# Patient Record
Sex: FEMALE | Race: WHITE | NOT HISPANIC OR LATINO | URBAN - METROPOLITAN AREA
[De-identification: names, ages, dates, MRNs, and addresses within clinical notes are randomized per-mention and may not be internally consistent; named-entity substitution may affect disease eponyms.]

---

## 2019-04-23 ENCOUNTER — TELEPHONE (OUTPATIENT)
Dept: OBSTETRICS AND GYNECOLOGY | Facility: CLINIC | Age: 34
End: 2019-04-23

## 2019-04-23 ENCOUNTER — LAB (OUTPATIENT)
Dept: LAB | Facility: HOSPITAL | Age: 34
End: 2019-04-23

## 2019-04-23 ENCOUNTER — OFFICE VISIT (OUTPATIENT)
Dept: OBSTETRICS AND GYNECOLOGY | Facility: CLINIC | Age: 34
End: 2019-04-23

## 2019-04-23 VITALS
SYSTOLIC BLOOD PRESSURE: 106 MMHG | WEIGHT: 111.2 LBS | DIASTOLIC BLOOD PRESSURE: 68 MMHG | HEIGHT: 66 IN | BODY MASS INDEX: 17.87 KG/M2

## 2019-04-23 DIAGNOSIS — R30.0 DYSURIA: ICD-10-CM

## 2019-04-23 DIAGNOSIS — Z01.411 ENCOUNTER FOR GYNECOLOGICAL EXAMINATION WITH ABNORMAL FINDING: ICD-10-CM

## 2019-04-23 DIAGNOSIS — N92.1 METRORRHAGIA: Primary | ICD-10-CM

## 2019-04-23 LAB
BILIRUB UR QL STRIP: NEGATIVE
CLARITY UR: CLEAR
COLOR UR: YELLOW
GLUCOSE UR STRIP-MCNC: NEGATIVE MG/DL
HGB UR QL STRIP.AUTO: NEGATIVE
KETONES UR QL STRIP: NEGATIVE
LEUKOCYTE ESTERASE UR QL STRIP.AUTO: NEGATIVE
NITRITE UR QL STRIP: NEGATIVE
PH UR STRIP.AUTO: 7 [PH] (ref 5–8)
PROT UR QL STRIP: NEGATIVE
SP GR UR STRIP: 1.02 (ref 1–1.03)
UROBILINOGEN UR QL STRIP: NORMAL

## 2019-04-23 PROCEDURE — 81003 URINALYSIS AUTO W/O SCOPE: CPT

## 2019-04-23 PROCEDURE — 99385 PREV VISIT NEW AGE 18-39: CPT | Performed by: OBSTETRICS & GYNECOLOGY

## 2019-04-23 RX ORDER — ALPRAZOLAM 2 MG/1
1 TABLET ORAL AS NEEDED
COMMUNITY
Start: 2019-03-07 | End: 2019-11-04

## 2019-04-23 RX ORDER — NORETHINDRONE ACETATE AND ETHINYL ESTRADIOL 1.5-30(21)
1 KIT ORAL DAILY
Qty: 28 TABLET | Refills: 5 | Status: SHIPPED | OUTPATIENT
Start: 2019-04-23 | End: 2019-07-02

## 2019-04-23 RX ORDER — TRAZODONE HYDROCHLORIDE 100 MG/1
1 TABLET ORAL NIGHTLY
COMMUNITY
Start: 2019-04-04 | End: 2019-11-04

## 2019-04-23 RX ORDER — DILTIAZEM HYDROCHLORIDE 60 MG/1
TABLET, FILM COATED ORAL
Refills: 1 | COMMUNITY
Start: 2019-04-10 | End: 2019-11-04

## 2019-04-23 RX ORDER — CLONAZEPAM 0.5 MG/1
1 TABLET ORAL AS NEEDED
COMMUNITY
Start: 2019-04-08 | End: 2019-11-04

## 2019-04-23 RX ORDER — LAMOTRIGINE 100 MG/1
100 TABLET ORAL 2 TIMES DAILY
COMMUNITY
End: 2019-11-04

## 2019-04-23 RX ORDER — DEXTROAMPHETAMINE SACCHARATE, AMPHETAMINE ASPARTATE, DEXTROAMPHETAMINE SULFATE AND AMPHETAMINE SULFATE 7.5; 7.5; 7.5; 7.5 MG/1; MG/1; MG/1; MG/1
1 TABLET ORAL DAILY
COMMUNITY
Start: 2019-04-04 | End: 2019-11-04

## 2019-04-23 NOTE — PROGRESS NOTES
Chief Complaint   Patient presents with   • Gynecologic Exam   • Menstrual Problem     intermenstrual spotting.  patient states that she gets a fever at the beginning of her periods. abdominal pain.  PMDD is severe.   • Urinary Frequency     urgency, dysuria       Antonette Aguirre is a 34 y.o. year old  presenting to be seen for her first annual exam with me.  This patient is nulliparous.  She does not desire childbearing.  She has developed heavy menses with passage of clots and has spotting almost daily between her periods.  She states that she was treated with Darling, oral contraceptives in the past for ovarian cyst formation.  She states that at the time of her menses she occasionally has fever and chills.  She denies any rash on her palms or feet.  She has had no syncope.  She currently has some dysuria.  She denies bowel symptoms.  She is treated for ADD and severe depression.  She currently takes Adderall, Klonopin, Xanax, and Trazodone.  SCREENING TESTS  NO PRIOR DATA  Year 2012 202 2023 [id number]   Age                         PAP                         HPV high risk                         Mammogram                         BILLY score                         Breast MRI                         Lipids                         Vitamin D                         Colonoscopy                         DEXA  Frax (hip/any)                         Ovarian Screen                             She exercises regularly: yes.  She wears her seat belt: yes.  She has concerns about domestic violence: no.  She has noticed changes in height: no    GYN screening history:  · No Pap test results available.    No Additional Complaints Reported    The following portions of the patient's history were reviewed and updated as appropriate:vital signs and   She  has a past medical history of Anxiety, Asthma, Depression, and Seasonal allergies.  She  "does not have any pertinent problems on file.  She  has no past surgical history on file.  Her family history is not on file.  She  reports that she has quit smoking. She has never used smokeless tobacco. She reports that she uses drugs. Drug: Marijuana. She reports that she does not drink alcohol.  Current Outpatient Medications   Medication Sig Dispense Refill   • albuterol (PROAIR RESPICLICK) 108 (90 Base) MCG/ACT inhaler Inhale 2 puffs As Needed for Wheezing.     • lamoTRIgine (LaMICtal) 100 MG tablet Take 100 mg by mouth 2 (Two) Times a Day.     • ALPRAZolam (XANAX) 2 MG tablet Take 1 tablet by mouth As Needed.     • amphetamine-dextroamphetamine (ADDERALL) 30 MG tablet Take 1 tablet by mouth Daily.     • clonazePAM (KlonoPIN) 0.5 MG tablet Take 1 tablet by mouth As Needed.     • SYMBICORT 80-4.5 MCG/ACT inhaler INHALE 2 PUFFS TWICE DAILY (MORNING AND EVENING)  1   • traZODone (DESYREL) 100 MG tablet Take 1 tablet by mouth Every Night.       No current facility-administered medications for this visit.      She is allergic to neurontin [gabapentin]..    Review of Systems  A comprehensive review of systems was taken.  Constitutional: negative for fever, chills, activity change, appetite change, fatigue and unexpected weight change.  Respiratory: negative  Cardiovascular: negative  Gastrointestinal: negative  Genitourinary:positive for dysuria and frequency  Musculoskeletal:negative  Behavioral/Psych: positive for anxiety, depression and mood swings       /68   Ht 167.6 cm (66\")   Wt 50.4 kg (111 lb 3.2 oz)   LMP 03/21/2019 (Approximate)   Breastfeeding? No   BMI 17.95 kg/m²     Physical Exam    General:  alert; cooperative; well developed; well nourished   Skin:  No suspicious lesions seen   Thyroid: normal to inspection and palpation   Lungs:  clear to auscultation bilaterally   Heart:  regular rate and rhythm, S1, S2 normal, no murmur, click, rub or gallop   Breasts:  Examined in supine " position  Symmetric without masses or skin dimpling  Nipples normal without inversion, lesions or discharge  There are no palpable axillary nodes   Abdomen: soft, non-tender; no masses  no umbilical or inguinal hernias are present  no hepato-splenomegaly   Pelvis: Clinical staff was present for exam  External genitalia:  normal appearance of the external genitalia including Bartholin's and [place]'s glands.  Vaginal:  normal pink mucosa without prolapse or lesions. blood present -  dark red;  Cervix:  normal appearance.  Uterus:  normal size, shape and consistency. anteverted;  Adnexa:  normal bimanual exam of the adnexa.  Rectal:  anus visually normal appearing. recto-vaginal exam unremarkable and confirms findings;     Lab Review   No data reviewed    Imaging  No data reviewed         ASSESSMENT  Problems Addressed this Visit        Genitourinary    Metrorrhagia - Primary    Relevant Orders    US Non-ob Transvaginal      Other Visit Diagnoses     Encounter for gynecological examination with abnormal finding        Relevant Orders    Liquid-based Pap Smear, Screening    Dysuria        Relevant Orders    Urinalysis With Culture If Indicated - Urine, Catheter In/Out          PLAN    · Medications prescribed this encounter:  No orders of the defined types were placed in this encounter.  · Cath UA sent  · Pap test done  · Pelvic ultrasound- reveals a normal size, anteverted uterus.  The endometrial stripe is 12.3 mm (the patient is bleeding).  The ovaries are normal with a tiny paraovarian cyst on the left side.  · I have had an 18-minute face-to-face discussion with this patient about her abnormal menses.  I have counseled her that she needs a pelvic ultrasound to evaluate her uterus and ovaries.  I have counseled her that if her pelvic ultrasound is normal I will consider cycling her with oral contraceptives.  I have discussed with her the possibility of hysteroscopy/D&C/endometrial ablation along with tubal  sterilization.  I have reviewed the potential risk factors of this procedure with her.  All of her questions were answered.  After reviewing the pelvic ultrasound results with her I have decided to initiate Microgestin 1.5/30 oral contraceptives to attempt to control her heavy and irregular flow.  If she is able to gain control she would then like to go to [facility] oral contraceptives.  I have advised her to notify her psychiatrist that she is taking an oral contraceptive.  · Regular weight-bearing exercise  · Follow up: 6 months to assess menses  *Please note that portions of this documentation may have been completed with a voice recognition program.  Efforts were made to edit this dictation, but occasional words may have been mistranscribed.       This note was electronically signed.    STEFANIA Gastelum MD  April 23, 2019  3:34 PM

## 2019-04-29 ENCOUNTER — TELEPHONE (OUTPATIENT)
Dept: OBSTETRICS AND GYNECOLOGY | Facility: CLINIC | Age: 34
End: 2019-04-29

## 2019-04-29 NOTE — TELEPHONE ENCOUNTER
PT STATES SHE IS LEAVING OUT OF TOWN AND DR BATERS PRESCRIBED HER BIRTH CONTROL SHE STARTED IT TODAY AND IT HAS MADE HER VERY SICK - THROWING UP AND NAUSEATED - WHAT CAN SHE DO?  PLEASE CALL [phone]

## 2019-04-29 NOTE — TELEPHONE ENCOUNTER
Patient is not interested in trying NuvaRing or any other type of birth control pill/hormone.  She has already tried these things in the past without relief.  She is leaving the country tomorrow for [place] and will be gone X 3 weeks.  She is going to try to take the birth control pills at another time, but will discontinue if she has nausea/vomiting.  She is interested in endometrial ablation/tubal sterilization.  She thought that this procedure would help with her PMDD, and I did let her know that the ablation would cause her to have no periods or very light periods, but she would still have ovarian function and may have no improvement in PMDD.  She voiced understanding, but still feels that she would like to have procedure.  She will call back to schedule.

## 2019-04-29 NOTE — TELEPHONE ENCOUNTER
"Patient started birth control pills last night.  Took at bedtime with Trazodone.  Had oatmeal and toast at bedtime (\"Trazodone makes me very hungry\").  Woke up at 3 am with nausea and vomiting.      Please advise.  "

## 2019-04-29 NOTE — TELEPHONE ENCOUNTER
Please tell the patient to try taking her birth control pill without any other medications.  If she still has nausea and vomiting she will need to discontinue the birth control pill and we will need to consider NuvaRing.

## 2019-07-02 ENCOUNTER — OFFICE VISIT (OUTPATIENT)
Dept: OBSTETRICS AND GYNECOLOGY | Facility: CLINIC | Age: 34
End: 2019-07-02

## 2019-07-02 VITALS
HEIGHT: 66 IN | BODY MASS INDEX: 17.87 KG/M2 | WEIGHT: 111.2 LBS | SYSTOLIC BLOOD PRESSURE: 100 MMHG | DIASTOLIC BLOOD PRESSURE: 62 MMHG

## 2019-07-02 DIAGNOSIS — N92.1 METRORRHAGIA: Primary | ICD-10-CM

## 2019-07-02 PROCEDURE — 99214 OFFICE O/P EST MOD 30 MIN: CPT | Performed by: OBSTETRICS & GYNECOLOGY

## 2019-07-02 RX ORDER — HYDROXYZINE HYDROCHLORIDE 25 MG/1
1 TABLET, FILM COATED ORAL AS NEEDED
COMMUNITY
Start: 2019-06-28 | End: 2019-11-04

## 2019-07-02 NOTE — PROGRESS NOTES
Chief Complaint   Patient presents with   • Follow-up     discuss treatment for metrorrhagia.  patient is interested in possible endometrial ablation/tubal sterilization.   • Fatigue   • Fever     patient states that she gets a fever 1-2 days prior to menses and possibly the first 2 days of periods.  states that this is not related to tampon use.       Antonette Aguirre is a 34 y.o. year old  presenting to be seen because of follow-up for heavy menses and intermenstrual bleeding.  This patient is nulliparous.  She does not desire child-bearing.  She has low-grade fever at the time of her menses.  She is treated for ADD and depression.  She was prescribed an oral contraceptive however she did not continue that medication.  She is not interested in an IUD or an NuvaRing.  She is interested in a more definitive treatment for her abnormal bleeding.    Social History     Substance and Sexual Activity   Sexual Activity Not Currently     SCREENING TESTS    Year 2012   Age                         PAP        Neg.                 HPV high risk                         Mammogram                         BILLY score                         Breast MRI                         Lipids                         Vitamin D                         Colonoscopy                         DEXA  Frax (hip/any)                         Ovarian Screen                             No Additional Complaints Reported    The following portions of the patient's history were reviewed and updated as appropriate:vital signs and   She  has a past medical history of Anxiety, Asthma, Depression, and Seasonal allergies.  She does not have any pertinent problems on file.  She  has no past surgical history on file.  Her family history is not on file.  She  reports that she has quit smoking. She has never used smokeless tobacco. She reports that she uses  "drugs. Drug: Marijuana. She reports that she does not drink alcohol.  Current Outpatient Medications   Medication Sig Dispense Refill   • albuterol (PROAIR RESPICLICK) 108 (90 Base) MCG/ACT inhaler Inhale 2 puffs As Needed for Wheezing.     • ALPRAZolam (XANAX) 2 MG tablet Take 1 tablet by mouth As Needed.     • amphetamine-dextroamphetamine (ADDERALL) 30 MG tablet Take 1 tablet by mouth Daily.     • clonazePAM (KlonoPIN) 0.5 MG tablet Take 1 tablet by mouth As Needed.     • hydrOXYzine (ATARAX) 25 MG tablet Take 1 tablet by mouth As Needed.     • lamoTRIgine (LaMICtal) 100 MG tablet Take 100 mg by mouth 2 (Two) Times a Day.     • SYMBICORT 80-4.5 MCG/ACT inhaler INHALE 2 PUFFS TWICE DAILY (MORNING AND EVENING)  1   • traZODone (DESYREL) 100 MG tablet Take 1 tablet by mouth Every Night.       No current facility-administered medications for this visit.      She is allergic to neurontin [gabapentin]..        Review of Systems  A comprehensive review of systems was done.  Constitutional: negative for fever, chills, activity change, appetite change, fatigue and unexpected weight change.  Respiratory: negative  Cardiovascular: negative  Gastrointestinal: negative  Genitourinary:positive for heavy menses and bleeding between menses  Musculoskeletal:negative  Behavioral/Psych: negative          /62   Ht 167.6 cm (66\")   Wt 50.4 kg (111 lb 3.2 oz)   LMP 06/08/2019 (Exact Date)   Breastfeeding? No   BMI 17.95 kg/m²     Physical Exam    General:  alert; cooperative; well developed; well nourished   Skin:  No suspicious lesions seen   Thyroid: normal to inspection and palpation   Lungs:  clear to auscultation bilaterally   Heart:  regular rate and rhythm, S1, S2 normal, no murmur, click, rub or gallop   Breasts:  Not performed.   Abdomen: soft, non-tender; no masses  no umbilical or inguinal hernias are present  no hepato-splenomegaly   Pelvis: Not performed.     Lab Review   No data reviewed    Imaging   No data " reviewed    Medical counseling was given to patient for the following topics: diagnostic results, instructions for management, risk factor reductions, impressions, risks and benefits of treatment options and importance of treatment compliance . Total time of the encounter was 28 minute(s) and 24 minute(s)  was spent in face to face counseling.  I have reviewed with the patient options of therapy including hormonal intervention; conservative management without medication; tubal sterilization and hysteroscopy endometrial ablation; and a minimally-invasive hysterectomy with retention of her ovaries.  The patient is adamant that she does not desire childbearing.  She prefers to have a more definitive procedure.  She is not willing to take a risk of an ablation not controlling her menses.  I have reviewed with her the surgical risks of bowel, bladder, ureteral injury; bleeding, infection, and anesthetic risks.  I have counseled the patient that at her age I would prefer to leave her ovaries in place.  She does not desire hormonal replacement therapy.  She is concerned about her anxiety/depression and effects of surgery and anesthesia.  I have addressed these concerns with her.            ASSESSMENT  Problems Addressed this Visit        Genitourinary    Metrorrhagia - Primary            PLAN    · Medications prescribed this encounter:  No orders of the defined types were placed in this encounter.  · Pamphlets about hysterectomy including robotic hysterectomy.  · Follow up: PRN to do pre-op visit  · Ibuprofen, 600 mg, Q6h PRN at menses     *Please note that portions of this documentation may have been completed with a voice recognition program.  Efforts were made to edit this dictation, but occasional words may have been mistranscribed.     This note was electronically signed.    STEFANIA Gastelum MD  July 2, 2019  4:03 PM

## 2019-07-24 ENCOUNTER — APPOINTMENT (OUTPATIENT)
Dept: PREADMISSION TESTING | Facility: HOSPITAL | Age: 34
End: 2019-07-24

## 2019-07-24 ENCOUNTER — OFFICE VISIT (OUTPATIENT)
Dept: OBSTETRICS AND GYNECOLOGY | Facility: CLINIC | Age: 34
End: 2019-07-24

## 2019-07-24 VITALS
WEIGHT: 110.6 LBS | SYSTOLIC BLOOD PRESSURE: 110 MMHG | HEIGHT: 66 IN | BODY MASS INDEX: 17.78 KG/M2 | DIASTOLIC BLOOD PRESSURE: 68 MMHG

## 2019-07-24 DIAGNOSIS — N92.1 METRORRHAGIA: Primary | ICD-10-CM

## 2019-07-24 PROCEDURE — 99215 OFFICE O/P EST HI 40 MIN: CPT | Performed by: OBSTETRICS & GYNECOLOGY

## 2019-07-24 NOTE — PROGRESS NOTES
Chief Complaint   Patient presents with   • Follow-up     patient is here today to discuss upcoming TLH.  will watch DVD about TLH today.       Antonette Aguirre is a 34 y.o. year old  presenting to be seen because of have questions answered about management of her metrorrhagia.  This patient has not desired hormonal intervention.  She has scheduled a robotically-assisted total laparoscopic hysterectomy/vaginal cuff suspension/bilateral salpingectomies, however she desires information about hysteroscopy/NovaSure endometrial ablation and laparoscopic tubal banding.  She has washed an informational tape about robotic hysterectomy.  I have then spent 45 minutes answering her questions about both of these approaches to surgery.  I have answered her questions about my experience doing these procedures.  I have answered her questions about risks and benefits including the surgical risks of bowel, bladder, ureteral injury; bleeding, infection, and anesthetic risks.  I have answered her questions about the failure rate of endometrial ablation and tubal sterilization.    Social History     Substance and Sexual Activity   Sexual Activity Not Currently            SCREENING TESTS    Year 2012   Age                         PAP        Neg.                 HPV high risk                         Mammogram                         BILLY score                         Breast MRI                         Lipids                         Vitamin D                         Colonoscopy                         DEXA  Frax (hip/any)                         Ovarian Screen                             No Additional Complaints Reported    The following portions of the patient's history were reviewed and updated as appropriate:vital signs and   She  has a past medical history of Anxiety, Asthma, Depression, and Seasonal allergies.  She  "does not have any pertinent problems on file.  She  has no past surgical history on file.  Her family history is not on file.  She  reports that she has quit smoking. She has never used smokeless tobacco. She reports that she uses drugs. Drug: Marijuana. She reports that she does not drink alcohol.  Current Outpatient Medications   Medication Sig Dispense Refill   • albuterol (PROAIR RESPICLICK) 108 (90 Base) MCG/ACT inhaler Inhale 2 puffs As Needed for Wheezing.     • ALPRAZolam (XANAX) 2 MG tablet Take 1 tablet by mouth As Needed.     • amphetamine-dextroamphetamine (ADDERALL) 30 MG tablet Take 1 tablet by mouth Daily.     • clonazePAM (KlonoPIN) 0.5 MG tablet Take 1 tablet by mouth As Needed.     • hydrOXYzine (ATARAX) 25 MG tablet Take 1 tablet by mouth As Needed.     • lamoTRIgine (LaMICtal) 100 MG tablet Take 100 mg by mouth 2 (Two) Times a Day.     • SYMBICORT 80-4.5 MCG/ACT inhaler INHALE 2 PUFFS TWICE DAILY (MORNING AND EVENING)  1   • traZODone (DESYREL) 100 MG tablet Take 1 tablet by mouth Every Night.       No current facility-administered medications for this visit.      She is allergic to neurontin [gabapentin]..        Review of Systems  A comprehensive review of systems was done.  Constitutional: negative for fever, chills, activity change, appetite change, fatigue and unexpected weight change.  Respiratory: negative  Cardiovascular: negative  Gastrointestinal: negative  Genitourinary:positive for heavy menses  Musculoskeletal:negative  Behavioral/Psych: negative          /68   Ht 167.6 cm (66\")   Wt 50.2 kg (110 lb 9.6 oz)   LMP 06/08/2019 (Exact Date)   Breastfeeding? No   BMI 17.85 kg/m²     Physical Exam    General:  alert; cooperative; well developed; well nourished   Skin:  Not performed.   Thyroid: not examined   Lungs:  Not performed.  breathing is unlabored   Heart:  Not performed.   Breasts:  Not performed.   Abdomen: Not performed.  no umbilical or inguinal hernias are " present  no hepato-splenomegaly   Pelvis: Not performed.     Lab Review   No data reviewed    Imaging   No data reviewed    Medical counseling was given to patient for the following topics: diagnostic results, instructions for management, risk factor reductions, prognosis, impressions and risks and benefits of treatment options . Total time of the encounter was 45 minute(s) and 45 minute(s)  was spent in face to face counseling.  The counseling issues discussed are mentioned in the present illness          ASSESSMENT  Problems Addressed this Visit        Genitourinary    Metrorrhagia - Primary            PLAN    · Medications prescribed this encounter:  No orders of the defined types were placed in this encounter.  · The patient will notify me of her desire for surgical intervention with either a robotically-assisted TLH/VCS/bilateral salpingectomies or a laparoscopic tubal banding along with hysteroscopy and NovaSure endometrial ablation.  · Follow up: 2 month(s)  · Calcium, 600 mg/ Vit. D, 400 IU daily     *Please note that portions of this documentation may have been completed with a voice recognition program.  Efforts were made to edit this dictation, but occasional words may have been mistranscribed.     This note was electronically signed.    STEFANIA Gastelum MD  July 24, 2019  4:15 PM

## 2019-08-20 DIAGNOSIS — N92.1 METRORRHAGIA: ICD-10-CM

## 2019-08-20 DIAGNOSIS — Z30.09 UNWANTED FERTILITY: Primary | ICD-10-CM

## 2019-08-20 DIAGNOSIS — N92.1 METRORRHAGIA: Primary | ICD-10-CM

## 2019-09-03 ENCOUNTER — APPOINTMENT (OUTPATIENT)
Dept: PREADMISSION TESTING | Facility: HOSPITAL | Age: 34
End: 2019-09-03

## 2019-09-17 ENCOUNTER — DOCUMENTATION (OUTPATIENT)
Dept: OBSTETRICS AND GYNECOLOGY | Facility: CLINIC | Age: 34
End: 2019-09-17

## 2019-09-17 NOTE — PROGRESS NOTES
History and Physical    Chief Complaint: Irregular menses and occasional bleeding between menses. Desire for surgical sterilization.    Antoentte Aguirre is a 34 y.o., , scented to my office on 2019 with complaints of irregular menses and occasional bleeding between her periods she did not desire hormonal intervention to control her menses.  She desires endometrial ablation via hysteroscopy; and also desires bilateral salpingectomies since she does not desire childbearing.  I have talked with her extensively about options of therapy.  I have counseled her extensively that removing her tubes will remove any possibility of spontaneous conception.  I have counseled her that an endometrial ablation procedure has an approximate 90% success rate resulting in scant menses or no menses.  I have reviewed with her the risks of surgery including bowel, bladder, and ureteral injury; uterine perforation, bleeding, infection, and anesthetic risks.  The patient voiced understanding of these risks.  Informed consent and sterilization consents have been signed.    Past Medical History:   Diagnosis Date   • Anxiety    • Asthma    • Depression     major depressive disorder   • Seasonal allergies        Allergies: Neurontin [gabapentin]    Medications:   Current Outpatient Medications:   •  albuterol (PROAIR RESPICLICK) 108 (90 Base) MCG/ACT inhaler, Inhale 2 puffs As Needed for Wheezing., Disp: , Rfl:   •  ALPRAZolam (XANAX) 2 MG tablet, Take 1 tablet by mouth As Needed., Disp: , Rfl:   •  amphetamine-dextroamphetamine (ADDERALL) 30 MG tablet, Take 1 tablet by mouth Daily., Disp: , Rfl:   •  clonazePAM (KlonoPIN) 0.5 MG tablet, Take 1 tablet by mouth As Needed., Disp: , Rfl:   •  hydrOXYzine (ATARAX) 25 MG tablet, Take 1 tablet by mouth As Needed., Disp: , Rfl:   •  lamoTRIgine (LaMICtal) 100 MG tablet, Take 100 mg by mouth 2 (Two) Times a Day., Disp: , Rfl:   •  SYMBICORT 80-4.5 MCG/ACT inhaler, INHALE 2 PUFFS TWICE  DAILY (MORNING AND EVENING), Disp: , Rfl: 1  •  traZODone (DESYREL) 100 MG tablet, Take 1 tablet by mouth Every Night., Disp: , Rfl:     Previous Surgery: No past surgical history on file.     Review of Systems  A comprehensive review of systems was negative.  Constitutional: negative for fever, chills, activity change, appetite change, fatigue and unexpected weight change.  Respiratory: negative  Cardiovascular: negative  Gastrointestinal: negative  Genitourinary:heavy flow and irregular cycles  Musculoskeletal:negative  Behavioral/Psych: positive for anxiety      Social History     Tobacco Use   • Smoking status: Former Smoker   • Smokeless tobacco: Never Used   Substance Use Topics   • Alcohol use: No     Frequency: Never       Recent Vitals       4/23/2019 7/2/2019 7/24/2019       BP:  106/68  100/62  110/68     Weight:  50.4 kg (111 lb 3.2 oz)  50.4 kg (111 lb 3.2 oz)  50.2 kg (110 lb 9.6 oz)     BMI (Calculated):  18  18  17.9           Physical Exam  General:  alert; cooperative; well developed; well nourished   Skin:  No suspicious lesions seen   Thyroid: normal to inspection and palpation   Lungs:  clear to auscultation bilaterally   Heart:  regular rate and rhythm, S1, S2 normal, no murmur, click, rub or gallop   Breasts:  Examined in supine position  Symmetric without masses or skin dimpling  Nipples normal without inversion, lesions or discharge  There are no palpable axillary nodes   Abdomen: soft, non-tender; no masses  no umbilical or inguinal hernias are present  no hepato-splenomegaly   Pelvis: Clinical staff was present for exam  External genitalia:  normal appearance of the external genitalia including Bartholin's and [place]'s glands.  Vaginal:  normal pink mucosa without prolapse or lesions.  Cervix:  normal appearance.  Uterus:  normal size, shape and consistency. anteverted;  Adnexa:  normal bimanual exam of the adnexa.  Rectal:  anus visually normal appearing. recto-vaginal exam unremarkable  and confirms findings;         Injury to bowel, Injury to bladder, Injury to ureter, bleeding, infection and anesthestic risks were explained to the patient and she voiced understanding.  I have explained to the patient that removing her fallopian tubes will eliminate the possibility of pregnancy.  I have explained to the patient that NovaSure endometrial ablation has an 80-90% chance of resulting in scant menses or no menses.  Informed consent was signed.    No contraindications to planned surgery were detected      Impression: 1) Metrorrhagia [N92.1], 2) Desire for surgical sterilization [Z30.09]        Plan: 1) Laparoscopic bilateral salpingectomy, 2) Hysteroscopy/D&C/Novasure endometrial ablation      *Please note that portions of this documentation may have been completed with a voice recognition program.  Efforts were made to edit this dictation, but occasional words may have been mistranscribed.  This note was electronically signed.    STEFANIA Gastelum MD  September 17, 2019  11:30 AM

## 2019-09-19 ENCOUNTER — LAB (OUTPATIENT)
Dept: LAB | Facility: HOSPITAL | Age: 34
End: 2019-09-19

## 2019-09-19 DIAGNOSIS — Z01.818 PREOPERATIVE TESTING: ICD-10-CM

## 2019-09-19 DIAGNOSIS — Z01.818 PREOPERATIVE TESTING: Primary | ICD-10-CM

## 2019-09-19 PROCEDURE — 85027 COMPLETE CBC AUTOMATED: CPT

## 2019-09-19 PROCEDURE — 80053 COMPREHEN METABOLIC PANEL: CPT

## 2019-09-19 PROCEDURE — 36415 COLL VENOUS BLD VENIPUNCTURE: CPT

## 2019-09-20 ENCOUNTER — OUTSIDE FACILITY SERVICE (OUTPATIENT)
Dept: OBSTETRICS AND GYNECOLOGY | Facility: CLINIC | Age: 34
End: 2019-09-20

## 2019-09-20 ENCOUNTER — LAB REQUISITION (OUTPATIENT)
Dept: LAB | Facility: HOSPITAL | Age: 34
End: 2019-09-20

## 2019-09-20 DIAGNOSIS — Z30.2 ENCOUNTER FOR STERILIZATION: ICD-10-CM

## 2019-09-20 LAB
ALBUMIN SERPL-MCNC: 4.5 G/DL (ref 3.5–5.2)
ALBUMIN/GLOB SERPL: 1.8 G/DL
ALP SERPL-CCNC: 49 U/L (ref 39–117)
ALT SERPL W P-5'-P-CCNC: 10 U/L (ref 1–33)
ANION GAP SERPL CALCULATED.3IONS-SCNC: 11 MMOL/L (ref 5–15)
AST SERPL-CCNC: 17 U/L (ref 1–32)
BILIRUB SERPL-MCNC: 0.3 MG/DL (ref 0.2–1.2)
BUN BLD-MCNC: 14 MG/DL (ref 6–20)
BUN/CREAT SERPL: 15.6 (ref 7–25)
CALCIUM SPEC-SCNC: 8.8 MG/DL (ref 8.6–10.5)
CHLORIDE SERPL-SCNC: 102 MMOL/L (ref 98–107)
CO2 SERPL-SCNC: 28 MMOL/L (ref 22–29)
CREAT BLD-MCNC: 0.9 MG/DL (ref 0.57–1)
DEPRECATED RDW RBC AUTO: 44.7 FL (ref 37–54)
ERYTHROCYTE [DISTWIDTH] IN BLOOD BY AUTOMATED COUNT: 13.1 % (ref 12.3–15.4)
GFR SERPL CREATININE-BSD FRML MDRD: 71 ML/MIN/1.73
GLOBULIN UR ELPH-MCNC: 2.5 GM/DL
GLUCOSE BLD-MCNC: 71 MG/DL (ref 65–99)
HCT VFR BLD AUTO: 38.6 % (ref 34–46.6)
HGB BLD-MCNC: 12.4 G/DL (ref 12–15.9)
MCH RBC QN AUTO: 30.1 PG (ref 26.6–33)
MCHC RBC AUTO-ENTMCNC: 32.1 G/DL (ref 31.5–35.7)
MCV RBC AUTO: 93.7 FL (ref 79–97)
PLATELET # BLD AUTO: 271 10*3/MM3 (ref 140–450)
PMV BLD AUTO: 11.1 FL (ref 6–12)
POTASSIUM BLD-SCNC: 4.4 MMOL/L (ref 3.5–5.2)
PROT SERPL-MCNC: 7 G/DL (ref 6–8.5)
RBC # BLD AUTO: 4.12 10*6/MM3 (ref 3.77–5.28)
SODIUM BLD-SCNC: 141 MMOL/L (ref 136–145)
WBC NRBC COR # BLD: 5.24 10*3/MM3 (ref 3.4–10.8)

## 2019-09-20 PROCEDURE — 88305 TISSUE EXAM BY PATHOLOGIST: CPT | Performed by: OBSTETRICS & GYNECOLOGY

## 2019-09-20 PROCEDURE — 88302 TISSUE EXAM BY PATHOLOGIST: CPT | Performed by: OBSTETRICS & GYNECOLOGY

## 2019-09-20 PROCEDURE — 58563 HYSTEROSCOPY ABLATION: CPT | Performed by: OBSTETRICS & GYNECOLOGY

## 2019-09-20 PROCEDURE — 58661 LAPAROSCOPY REMOVE ADNEXA: CPT | Performed by: OBSTETRICS & GYNECOLOGY

## 2019-09-23 LAB
CYTO UR: NORMAL
LAB AP CASE REPORT: NORMAL
LAB AP CLINICAL INFORMATION: NORMAL
PATH REPORT.FINAL DX SPEC: NORMAL
PATH REPORT.GROSS SPEC: NORMAL

## 2019-10-02 ENCOUNTER — TELEPHONE (OUTPATIENT)
Dept: OBSTETRICS AND GYNECOLOGY | Facility: CLINIC | Age: 34
End: 2019-10-02

## 2019-10-15 ENCOUNTER — TELEPHONE (OUTPATIENT)
Dept: OBSTETRICS AND GYNECOLOGY | Facility: CLINIC | Age: 34
End: 2019-10-15

## 2019-10-15 NOTE — TELEPHONE ENCOUNTER
I received a call from the [facility] regarding Kandace Vieyra.  The patient had asked them to call me.  She underwent a hysteroscopy D&C NovaSure endometrial ablation as well as laparoscopic, bilateral salpingectomies on 9/20/2019 at [facility].  The patient presented there with lower abdominal crampy pain and a temperature of 101 °F.  A CT scan revealed fluid in the endometrial cavity (which is normal post ablation), and no other abnormalities.  I have instructed the emergency room physician to treat her with metronidazole, 500 mg every 8 hours for 7 days and ciprofloxacin, 500 mg twice a day for 7 days.  I asked him to have the patient contact me within the next week.

## 2019-11-04 ENCOUNTER — OFFICE VISIT (OUTPATIENT)
Dept: OBSTETRICS AND GYNECOLOGY | Facility: CLINIC | Age: 34
End: 2019-11-04

## 2019-11-04 VITALS
SYSTOLIC BLOOD PRESSURE: 118 MMHG | WEIGHT: 121 LBS | DIASTOLIC BLOOD PRESSURE: 56 MMHG | BODY MASS INDEX: 19.44 KG/M2 | HEIGHT: 66 IN

## 2019-11-04 DIAGNOSIS — Z98.890 POSTOPERATIVE STATE: Primary | ICD-10-CM

## 2019-11-04 PROBLEM — N92.1 METRORRHAGIA: Status: RESOLVED | Noted: 2019-04-23 | Resolved: 2019-11-04

## 2019-11-04 PROCEDURE — 99024 POSTOP FOLLOW-UP VISIT: CPT | Performed by: OBSTETRICS & GYNECOLOGY

## 2019-11-04 RX ORDER — ALPRAZOLAM 0.25 MG/1
0.25 TABLET ORAL NIGHTLY PRN
Refills: 0 | COMMUNITY
Start: 2019-09-26

## 2019-11-04 RX ORDER — GARLIC EXTRACT 500 MG
CAPSULE ORAL
Refills: 0 | COMMUNITY
Start: 2019-10-24

## 2019-11-04 RX ORDER — PREDNISONE 2.5 MG
TABLET ORAL
Refills: 0 | COMMUNITY
Start: 2019-10-24

## 2019-11-04 RX ORDER — LAMOTRIGINE 200 MG/1
1 TABLET ORAL DAILY
COMMUNITY
Start: 2019-09-03

## 2019-11-04 NOTE — PROGRESS NOTES
Chief Complaint   Patient presents with   • Post-op       Antonette Aguirre is a 34 y.o. year old  presenting to be seen for her post-operative visit following laparoscopic bilateral salpingectomy; and a hysteroscopy/D&C/NovaSure endometrial ablation for her Raja and a desire for sterilization.  Approximately 1 week postoperatively she was hospitalized in [place] for treatment of pneumonia.  She has had no postoperative gynecologic complaints.  She has had no bleeding.  She denies bowel or urinary symptoms.  Pathology from the endometrial curettings was benign and the fallopian tubes were benign.  Procedure:  Hysteroscopy/D & C, Endometrial Ablation and Laparoscopic bilateral salpingectomy    ROS:  A comprehensive review of systems was negative.  Constitutional: negative for fever, chills, activity change, appetite change, fatigue and unexpected weight change.  Respiratory: negative   Cardiovascular: negative  Gastrointestinal: negative  Genitourinary:negative  Musculoskeletal:negative  Behavioral/Psych: negative      Symptoms:  Fever  No, Chills/Sweat  No, Nausea/Vomiting    No, Normal Bowel Function  Yes, Normal Urinary Function  Yes and Abnormal Discharge   No    No bleeding        Physical Exam:  Lungs:  Normal expansion.  Clear to auscultation.  No rales, rhonchi, or wheezing.  Abdomen:  Soft, non-tender, normal bowel sounds; no bruits, organomegaly or masses.  Incisions are intact and dry   Pelvic:  Clinical staff was present for exam  External genitalia:  normal appearance of the external genitalia including Bartholin's and Surprise's glands.  Vaginal:  normal pink mucosa without prolapse or lesions.  Cervix:  normal appearance.  Uterus:  normal size, shape and consistency. anteverted;  Adnexa:  normal bimanual exam of the adnexa.          Impression: 1) Status-post laparoscopic bilateral salpingectomy; hysteroscopy/D&C/NovaSure endometrial ablation for generalization and metrorrhagia.        PLAN:  1. Follow up: 11 month(s) for AG  2. Resume normal activities  *Please note that portions of this documentation may have been completed with a voice recognition program.  Efforts were made to edit this dictation, but occasional words may have been mistranscribed.      This note was electronically signed.    STEFANIA Gastelum MD  November 4, 2019  3:36 PM